# Patient Record
Sex: MALE | Race: WHITE | NOT HISPANIC OR LATINO | Employment: OTHER | URBAN - METROPOLITAN AREA
[De-identification: names, ages, dates, MRNs, and addresses within clinical notes are randomized per-mention and may not be internally consistent; named-entity substitution may affect disease eponyms.]

---
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
## 2023-11-17 ENCOUNTER — OFFICE VISIT (OUTPATIENT)
Dept: NEUROLOGY | Facility: CLINIC | Age: 73
End: 2023-11-17
Payer: COMMERCIAL

## 2023-11-17 VITALS
SYSTOLIC BLOOD PRESSURE: 138 MMHG | HEIGHT: 72 IN | WEIGHT: 230.6 LBS | DIASTOLIC BLOOD PRESSURE: 89 MMHG | TEMPERATURE: 97 F | BODY MASS INDEX: 31.23 KG/M2 | HEART RATE: 80 BPM

## 2023-11-17 DIAGNOSIS — G31.84 MCI (MILD COGNITIVE IMPAIRMENT): Primary | ICD-10-CM

## 2023-11-17 NOTE — PATIENT INSTRUCTIONS
Return after neuropsychological testing  Need to obtain medications from patient and his primary care
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
## 2024-06-06 NOTE — PROGRESS NOTES
Neurology Memory Clinic Followup Note    f/u memory deficits  =============================================================================  Interim History:  Mr. Calderón is a 73 year old right-handed male presenting for evaluation of memory problems. He was last seen in clinic in 11/17/2023 and returns today for follow up. No informant accompanied with him today. Called his daughter Shaunna on the phone to get some collateral information. Patient has a pending neuropsychological test in November 14th of 2024 (cancelled on May 27th by clinic).    He subjectively thinks that his memory has been getting a little bit worse. He has to write everything down and keep schedule checked. Otherwise, he would have a hard time remembering what he was supposed to do. Daughter on the other hand thinks that he is going through divorce processes and finally gets back to his feet/thinks that he is actually doing better.    He recently went through divorce paperwork and he reports that if he thinks about it, he would cry in the morning and then at night. He feels lonely and spends his afternoon (5-7pm) to go to bar and get some drinks. He wakes up in the morning at 8Am and works on his project and gets back home around 7pm when he spent most of his night watching TV and getting into sleep around 2AM. He goes to Karate classes on Tuesday nights.      He is still driving around without any trouble; he uses GPS to get around and denies any trouble with navigation. His daughter checked on him periodically and he also has a health care home attendant come in every other Monday to double check on his medications, bills, and mails. He manages his own finance and takes medications regularly per report but does not know what specific medications he is taking. Only knows that he used to take 5 in the morning 3 at night and now switched to 3 in the morning and 5 at night since somehow medications in the morning upset his  stomach.  =============================================================================  ROS:  General: no weight loss or fever  Cardiac: no chest pain or palpitations  Pulmonary: no SOB or cough  GI: no abdominal pain, nausea, vomiting, or constipation  : no urinary urgency, pain or incontinence  Musculoskeletal: no joint pain or stiffness  Skin: no rashes or easy bruising  Neurological: as above  =============================================================================  Medications:    5 medications at night and 3 in the morning  Neither patient nor daughter knows his medication list.    =============================================================================  A/P: Mr. Calderón is a 73 year old right-handed male who presents with memory problems. Some limitations with history due to lack of insights or details from patient or family who provided collateral information. Based on medical records from prior evaluations, his cognitive complaints have been 5+ year but some decline since 2022 when his neuropsychological testing noted executive dysfunction. Some complaints of lack of insights, behavior changes and possible REM sleep and tremor for possible LBD vs bvFTD.  His prior MoCA in November of 2023 was 25/30 with 2/5 recalls (4/5 with cues). In addition, no evidence of Parkinsonian features on exam. His clinical manifestation is suggestive of mild cognitive impairment with some frontal and temporal lobe dysfunction. Etiologies could be bvFTD vs Alzheimer's disease. Lewy Body dementia is less likely.      Follow up Neuropsychological testing for better differentiation of etiologies.   Will need to continue discussing with patient and family on long-term planning and management regarding POA and advance directive for medical decisions, supervision on medications and long-term care placement.  Ask daughter to join his next visit along with his medication list.    Follow-up: Return in about 6 months after  neuropsychological testing.

## 2024-06-07 ENCOUNTER — OFFICE VISIT (OUTPATIENT)
Dept: NEUROLOGY | Facility: CLINIC | Age: 74
End: 2024-06-07
Payer: COMMERCIAL

## 2024-06-07 VITALS
HEART RATE: 69 BPM | SYSTOLIC BLOOD PRESSURE: 121 MMHG | BODY MASS INDEX: 30.48 KG/M2 | DIASTOLIC BLOOD PRESSURE: 75 MMHG | WEIGHT: 230 LBS | HEIGHT: 73 IN

## 2024-06-07 DIAGNOSIS — F02.80 FRONTOTEMPORAL DEMENTIA (H): ICD-10-CM

## 2024-06-07 DIAGNOSIS — G31.09 FRONTOTEMPORAL DEMENTIA (H): ICD-10-CM

## 2024-06-07 DIAGNOSIS — G31.84 MILD COGNITIVE IMPAIRMENT: Primary | ICD-10-CM

## 2024-06-07 NOTE — LETTER
6/7/2024       RE: Vikash Calderón  [address]  [place] [address]       Dear Colleague,    Thank you for referring your patient, Vikash Calderón, to the  [facility] at [facility]. Please see a copy of my visit note below.    Neurology Memory Clinic Followup Note    f/u memory deficits  =============================================================================  Interim History:  Mr. Calderón is a 73 year old right-handed male presenting for evaluation of memory problems. He was last seen in clinic in 11/17/2023 and returns today for follow up. No informant accompanied with him today. Called his daughter Shaunna on the phone to get some collateral information. Patient has a pending neuropsychological test in November 14th of 2024 (cancelled on May 27th by clinic).    He subjectively thinks that his memory has been getting a little bit worse. He has to write everything down and keep schedule checked. Otherwise, he would have a hard time remembering what he was supposed to do. Daughter on the other hand thinks that he is going through divorce processes and finally gets back to his feet/thinks that he is actually doing better.    He recently went through divorce paperwork and he reports that if he thinks about it, he would cry in the morning and then at night. He feels lonely and spends his afternoon (5-7pm) to go to bar and get some drinks. He wakes up in the morning at 8Am and works on his project and gets back home around 7pm when he spent most of his night watching TV and getting into sleep around 2AM. He goes to Karate classes on Tuesday nights.      He is still driving around without any trouble; he uses GPS to get around and denies any trouble with navigation. His daughter checked on him periodically and he also has a health care home attendant come in every other Monday to double check on his medications, bills, and mails. He  manages his own finance and takes medications regularly per report but does not know what specific medications he is taking. Only knows that he used to take 5 in the morning 3 at night and now switched to 3 in the morning and 5 at night since somehow medications in the morning upset his stomach.  =============================================================================  ROS:  General: no weight loss or fever  Cardiac: no chest pain or palpitations  Pulmonary: no SOB or cough  GI: no abdominal pain, nausea, vomiting, or constipation  : no urinary urgency, pain or incontinence  Musculoskeletal: no joint pain or stiffness  Skin: no rashes or easy bruising  Neurological: as above  =============================================================================  Medications:    5 medications at night and 3 in the morning  Neither patient nor daughter knows his medication list.    =============================================================================  A/P: Mr. Calderón is a 73 year old right-handed male who presents with memory problems. Some limitations with history due to lack of insights or details from patient or family who provided collateral information. Based on medical records from prior evaluations, his cognitive complaints have been 5+ year but some decline since 2022 when his neuropsychological testing noted executive dysfunction. Some complaints of lack of insights, behavior changes and possible REM sleep and tremor for possible LBD vs bvFTD.  His prior MoCA in November of 2023 was 25/30 with 2/5 recalls (4/5 with cues). In addition, no evidence of Parkinsonian features on exam. His clinical manifestation is suggestive of mild cognitive impairment with some frontal and temporal lobe dysfunction. Etiologies could be bvFTD vs Alzheimer's disease. Lewy Body dementia is less likely.      Follow up Neuropsychological testing for better differentiation of etiologies.   Will need to continue discussing with  patient and family on long-term planning and management regarding POA and advance directive for medical decisions, supervision on medications and long-term care placement.  Ask daughter to join his next visit along with his medication list.    Follow-up: Return in about 6 months after neuropsychological testing.       Again, thank you for allowing me to participate in the care of your patient.      Sincerely,    Armando Hartley MD

## 2024-06-13 ENCOUNTER — TELEPHONE (OUTPATIENT)
Dept: NEUROLOGY | Facility: CLINIC | Age: 74
End: 2024-06-13

## 2024-11-14 ENCOUNTER — OFFICE VISIT (OUTPATIENT)
Dept: NEUROPSYCHOLOGY | Facility: CLINIC | Age: 74
End: 2024-11-14
Attending: SPECIALIST
Payer: COMMERCIAL

## 2024-11-14 DIAGNOSIS — R41.844 FRONTAL LOBE AND EXECUTIVE FUNCTION DEFICIT: Primary | ICD-10-CM

## 2024-11-14 DIAGNOSIS — G31.84 MCI (MILD COGNITIVE IMPAIRMENT): ICD-10-CM

## 2024-11-14 DIAGNOSIS — F41.9 ANXIETY: ICD-10-CM

## 2024-11-14 DIAGNOSIS — F06.8 OTHER SPECIFIED MENTAL DISORDERS DUE TO KNOWN PHYSIOLOGICAL CONDITION: ICD-10-CM
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
## 2024-11-15 NOTE — PROGRESS NOTES
NAME  Vikash Calderón    MRN  [id number]      [birth year]     AGE  74     SEX  Male     HANDEDNESS Right     EDUCATION 16     STEELE  24     PROVIDER       TECH  LR/KS     STATION  OP            ORIENTATION      Time  0     Personal Info.     Place      Presidents  3 /6           WAIS-IV         WMI: 92                           Raw ScS    Similarities  26 12    Block Design 20 8    Digit Span  22 9    Arithmetic  10 8    Coding  64 14           DIMPLE-O COMPLEX FIGURE       Raw T %ile   Copy  28  11-16   Time to Copy 123  >16                       WRAT    5     SS %ile GE   Reading  2 30 10.2          COWAT FAS       Raw 32      ScS 8      T 42             ANIMAL FLUENCY      Raw 7      ScS 2      T 19              BOSTON NAMING TEST     Raw 47 /60     ScS 8      %ile 19-28             COMPLEX IDEATIONAL MATERIAL     Raw 8      ScS 3      T 13             TRAIL MAKING TEST       Time Errors ScS %ile   A 30 0 12 72-81   B 119 2 10 41-59          STROOP        Raw %ile     Word 85 41-59     Color 65 60-71     C/W 33 72-81             MAGDALENE   H-Short   Raw 22      %ile 41-59             GROOVED PEGBOARD       Raw Drops ScS T   RH 76 0 7 54   LH 78 0 8 57          VIKY       Raw 9      Interp. Mild             GDS       Raw 6      Interp. Minimal              WMS-IV LOGICAL MEMORY OA    Raw ScS/%ile     LM I 20 7     LM II 2 4     Recog. 15 17-25             HVLT   1     Raw T    Trial 1  5     Trial 2  9     Trial 3  10     Learning  5     Total Recall 24 52    Delayed Recall 7 45    Percent Retention 70% 42    True Positives 11     False Positives 2     Disc. Index  9 43            BVMT   1     Raw T/%ile    Trial 1  1     Trial 2  5     Trial 3  6     Learning  5     Total Recall 12 36    Delayed Recall 5 39    Percent Retention 83% >16    Recognition Hits 4     Recognition F.P 0     Disc. Index  11-16 0           DCT       E-Score 10

## 2024-11-15 NOTE — PROGRESS NOTES
Pt was seen for neuropsychological evaluation at the request of Dr. Armando Hartley  for the purposes of diagnostic clarification and treatment planning. 196 minutes of test administration and scoring were provided by this writer. Please see Dr. Ramez Suggs's report for a full interpretation of the findings.    Yvonne Matt  Psychometrist
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
